# Patient Record
Sex: MALE | Race: WHITE | HISPANIC OR LATINO | Employment: FULL TIME | URBAN - METROPOLITAN AREA
[De-identification: names, ages, dates, MRNs, and addresses within clinical notes are randomized per-mention and may not be internally consistent; named-entity substitution may affect disease eponyms.]

---

## 2020-06-25 ENCOUNTER — OFFICE VISIT (OUTPATIENT)
Dept: URGENT CARE | Facility: CLINIC | Age: 70
End: 2020-06-25
Payer: MEDICARE

## 2020-06-25 VITALS
HEART RATE: 88 BPM | BODY MASS INDEX: 22.49 KG/M2 | OXYGEN SATURATION: 98 % | RESPIRATION RATE: 17 BRPM | DIASTOLIC BLOOD PRESSURE: 63 MMHG | WEIGHT: 135 LBS | TEMPERATURE: 98 F | HEIGHT: 65 IN | SYSTOLIC BLOOD PRESSURE: 149 MMHG

## 2020-06-25 DIAGNOSIS — R42 DIZZINESS: Primary | ICD-10-CM

## 2020-06-25 DIAGNOSIS — R41.0 CONFUSION: ICD-10-CM

## 2020-06-25 DIAGNOSIS — R13.10 DYSPHAGIA, UNSPECIFIED TYPE: ICD-10-CM

## 2020-06-25 LAB
BILIRUB UR QL STRIP: NEGATIVE
GLUCOSE UR QL STRIP: NEGATIVE
KETONES UR QL STRIP: NEGATIVE
LEUKOCYTE ESTERASE UR QL STRIP: NEGATIVE
PH, POC UA: 6 (ref 5–8)
POC BLOOD, URINE: NEGATIVE
POC NITRATES, URINE: NEGATIVE
PROT UR QL STRIP: NEGATIVE
SP GR UR STRIP: 1 (ref 1–1.03)
UROBILINOGEN UR STRIP-ACNC: NORMAL (ref 0.3–2.2)

## 2020-06-25 PROCEDURE — 99203 OFFICE O/P NEW LOW 30 MIN: CPT | Mod: 25,S$GLB,, | Performed by: STUDENT IN AN ORGANIZED HEALTH CARE EDUCATION/TRAINING PROGRAM

## 2020-06-25 PROCEDURE — 81003 URINALYSIS AUTO W/O SCOPE: CPT | Mod: QW,S$GLB,, | Performed by: STUDENT IN AN ORGANIZED HEALTH CARE EDUCATION/TRAINING PROGRAM

## 2020-06-25 PROCEDURE — 93005 ELECTROCARDIOGRAM TRACING: CPT | Mod: S$GLB,,, | Performed by: STUDENT IN AN ORGANIZED HEALTH CARE EDUCATION/TRAINING PROGRAM

## 2020-06-25 PROCEDURE — 93010 EKG 12-LEAD: ICD-10-PCS | Mod: S$GLB,,, | Performed by: INTERNAL MEDICINE

## 2020-06-25 PROCEDURE — 99203 PR OFFICE/OUTPT VISIT, NEW, LEVL III, 30-44 MIN: ICD-10-PCS | Mod: 25,S$GLB,, | Performed by: STUDENT IN AN ORGANIZED HEALTH CARE EDUCATION/TRAINING PROGRAM

## 2020-06-25 PROCEDURE — 93005 EKG 12-LEAD: ICD-10-PCS | Mod: S$GLB,,, | Performed by: STUDENT IN AN ORGANIZED HEALTH CARE EDUCATION/TRAINING PROGRAM

## 2020-06-25 PROCEDURE — 93010 ELECTROCARDIOGRAM REPORT: CPT | Mod: S$GLB,,, | Performed by: INTERNAL MEDICINE

## 2020-06-25 PROCEDURE — 81003 POCT URINALYSIS, DIPSTICK, AUTOMATED, W/O SCOPE: ICD-10-PCS | Mod: QW,S$GLB,, | Performed by: STUDENT IN AN ORGANIZED HEALTH CARE EDUCATION/TRAINING PROGRAM

## 2020-06-25 RX ORDER — CLOPIDOGREL BISULFATE 75 MG/1
TABLET ORAL
COMMUNITY
Start: 2020-05-26

## 2020-06-25 RX ORDER — HYDROCHLOROTHIAZIDE 12.5 MG/1
TABLET ORAL
COMMUNITY
Start: 2020-06-12

## 2020-06-25 RX ORDER — LISINOPRIL 10 MG/1
TABLET ORAL
COMMUNITY
Start: 2020-06-12

## 2020-06-25 RX ORDER — METFORMIN HYDROCHLORIDE 500 MG/1
TABLET ORAL
COMMUNITY
Start: 2020-06-04

## 2020-06-25 RX ORDER — SIMVASTATIN 40 MG/1
TABLET, FILM COATED ORAL
COMMUNITY
Start: 2020-05-04

## 2020-06-25 NOTE — PATIENT INSTRUCTIONS

## 2020-06-25 NOTE — PROGRESS NOTES
"Subjective:       Patient ID: Sebastian Hernandez is a 70 y.o. male.    Vitals:  height is 5' 5" (1.651 m) and weight is 61.2 kg (135 lb). His tympanic temperature is 97.5 °F (36.4 °C). His blood pressure is 167/67 (abnormal) and his pulse is 81. His respiration is 17 and oxygen saturation is 98%.     Chief Complaint: Dizziness    69yo M with PMHx of CVA presents with wife. History is difficult as patient and wife differ on opinions, but it appears over the last 3 months with quarantine pt has been increasingly fatigued and sleeping more with decreased appetite. Wife became concerned because on Sunday she noticed he was dizzy with standing and ambulating and is spitting while eating. Pt states dizzines started 2 weeks ago. Offered  service but pt did not use and wife preferred to translate, pt also speaks English well and comprehends most questions. Denied f/c, urinary sx's, GI sx's, numbness, weakness, facial droop, slurred speech, HA, CP, syncope, peripheral edema. Pt denies confusion but wife believes he has been confused over last 3 months.    Dizziness:   Chronicity:  New  Onset:  1 to 4 weeks ago (x2 weeks ago)  Progression since onset:  Gradually worsening  Frequency:  Constantly  Pain Scale:  0/10  Severity:  Moderate  Duration:  1 minute  Dizziness characteristics:  Off-balance  Frequency of Spells:  Daily   Associated symptoms: light-headedness.no hearing loss, no fever, no headaches, no tinnitus, no nausea, no vomiting, no diaphoresis, no visual disturbances, no syncope, no palpitations, no facial weakness, no slurred speech, no numbness in extremities and no chest pain.  Aggravated by:  Lying down, getting up, bending and position changes  Risk factors:  Stroke  Treatments tried:  Nothing  Improvements on treatment:  No relief   PMH includes: strokes.no head trauma and no head trauma.      Constitution: Negative for chills, sweating and fever.   HENT: Positive for trouble swallowing. Negative " "for tinnitus, hearing loss, facial swelling, congestion, sinus pain and sore throat.    Neck: Negative for neck pain and neck stiffness.   Cardiovascular: Negative for chest pain, palpitations, sob on exertion and passing out.   Eyes: Negative for eye pain, photophobia, vision loss, double vision and blurred vision.   Respiratory: Negative for chest tightness, cough, shortness of breath, stridor and wheezing.    Gastrointestinal: Negative for abdominal pain, nausea, vomiting and diarrhea.   Musculoskeletal: Negative for trauma and muscle ache.   Skin: Negative for rash, wound and lesion.   Neurological: Positive for dizziness, light-headedness and coordination disturbances. Negative for history of vertigo, passing out, facial drooping, speech difficulty, loss of balance, headaches, history of migraines, disorientation, loss of consciousness, numbness and tingling.   Psychiatric/Behavioral: Negative for disorientation, confusion, agitation, nervous/anxious, sleep disturbance and depression. The patient is not nervous/anxious.        Objective:       Vitals:    06/25/20 1319 06/25/20 1323 06/25/20 1324   BP: (!) 164/66 (!) 167/67 (!) 149/63   BP Location: Left arm Left arm Left arm   Patient Position: Lying Sitting Standing   BP Method: Medium (Automatic) Medium (Automatic) Medium (Automatic)   Pulse: 81 81 88   Resp: 17     Temp: 97.5 °F (36.4 °C)     TempSrc: Tympanic     SpO2: 98%     Weight: 61.2 kg (135 lb)     Height: 5' 5" (1.651 m)       Physical Exam   Constitutional: He is oriented to person, place, and time. He appears well-developed. No distress.   HENT:   Head: Normocephalic and atraumatic.   Right Ear: Hearing and external ear normal.   Left Ear: Hearing and external ear normal.   Nose: Nose normal.   Eyes: Pupils are equal, round, and reactive to light. Conjunctivae and EOM are normal. Right eye exhibits no discharge. Left eye exhibits no discharge.   Neck: Normal range of motion. Neck supple. "   Cardiovascular: Normal rate, regular rhythm and normal heart sounds.   No murmur heard.  Pulmonary/Chest: Effort normal and breath sounds normal. He has no wheezes. He has no rales.   Musculoskeletal: Normal range of motion.         General: No tenderness.   Neurological: He is alert and oriented to person, place, and time. He displays no weakness. No cranial nerve deficit (CN II-XII intact) or sensory deficit. He exhibits normal muscle tone. Coordination normal.   Patient oriented to person, time, place, and situation; no focal deficits; pt did stumble on day of the week and year but quickly corrected himself without prompting   Skin: Skin is warm, dry and no rash.   Psychiatric: His behavior is normal. Judgment and thought content normal.   Nursing note and vitals reviewed.        EKG: NSR without ectopy or significant ST/TW abnormalities ( physician interpretation)  Recent Lab Results       06/25/20  1416        POC Blood, Urine Negative     POC Bilirubin, Urine Negative     POC Ketones, Urine Negative     POC Protein, Urine Negative     POC Nitrates, Urine Negative     POC Glucose, Urine Negative     POC Leukocytes, Urine Negative     POC Urobilinogen, Urine Normal     POC Specific Gravity, Urine 1.005     pH, UA 6.0           Assessment:       1. Dizziness    2. Dysphagia, unspecified type    3. Confusion        Plan:         Dizziness  -     POCT Urinalysis, Dipstick, Automated, W/O Scope  -     IN OFFICE EKG 12-LEAD (to Muse) - study independently reviewed and interpreted by  physician and discussed results with patient    Pt with PMHx of CVA presents for dizziness and confusion with hx of possible dysphagia. No gross deficits on exam and pt oriented and appropriate. Difficult to assess due to competing hx from wife and pt. Suspect likely pt has depression with quarantine vs new onset of dementia (likely vascular), but wife is very concerned and cannot rule out CVA from . Counseled pt and wife to  follow-up with  ER for CT head and further evaluation. Questions answered and both expressed understanding.    Jose Simmons MD/MPH  [facility]  [facility]